# Patient Record
Sex: MALE | NOT HISPANIC OR LATINO
[De-identification: names, ages, dates, MRNs, and addresses within clinical notes are randomized per-mention and may not be internally consistent; named-entity substitution may affect disease eponyms.]

---

## 2017-10-18 ENCOUNTER — RECORDS - HEALTHEAST (OUTPATIENT)
Dept: ADMINISTRATIVE | Facility: OTHER | Age: 37
End: 2017-10-18

## 2017-12-06 ENCOUNTER — COMMUNICATION - HEALTHEAST (OUTPATIENT)
Dept: TELEHEALTH | Facility: CLINIC | Age: 37
End: 2017-12-06

## 2017-12-06 ENCOUNTER — OFFICE VISIT - HEALTHEAST (OUTPATIENT)
Dept: INTERNAL MEDICINE | Facility: CLINIC | Age: 37
End: 2017-12-06

## 2017-12-06 DIAGNOSIS — Z86.19 HISTORY OF LYME DISEASE: ICD-10-CM

## 2017-12-06 DIAGNOSIS — J30.89 CHRONIC NON-SEASONAL ALLERGIC RHINITIS, UNSPECIFIED TRIGGER: ICD-10-CM

## 2017-12-06 DIAGNOSIS — E03.9 HYPOTHYROIDISM, UNSPECIFIED TYPE: ICD-10-CM

## 2017-12-06 RX ORDER — ALBUTEROL SULFATE 90 UG/1
AEROSOL, METERED RESPIRATORY (INHALATION)
Refills: 0 | Status: SHIPPED | COMMUNITY
Start: 2017-10-05

## 2017-12-06 ASSESSMENT — MIFFLIN-ST. JEOR: SCORE: 1680.1

## 2017-12-07 ENCOUNTER — COMMUNICATION - HEALTHEAST (OUTPATIENT)
Dept: INTERNAL MEDICINE | Facility: CLINIC | Age: 37
End: 2017-12-07

## 2017-12-10 ENCOUNTER — COMMUNICATION - HEALTHEAST (OUTPATIENT)
Dept: INTERNAL MEDICINE | Facility: CLINIC | Age: 37
End: 2017-12-10

## 2017-12-10 DIAGNOSIS — Z86.19 HISTORY OF LYME DISEASE: ICD-10-CM

## 2017-12-11 RX ORDER — DOXYCYCLINE 100 MG/1
CAPSULE ORAL
Qty: 20 CAPSULE | Refills: 0 | Status: SHIPPED | OUTPATIENT
Start: 2017-12-11

## 2017-12-22 ENCOUNTER — COMMUNICATION - HEALTHEAST (OUTPATIENT)
Dept: INTERNAL MEDICINE | Facility: CLINIC | Age: 37
End: 2017-12-22

## 2018-02-23 ENCOUNTER — OFFICE VISIT - HEALTHEAST (OUTPATIENT)
Dept: INTERNAL MEDICINE | Facility: CLINIC | Age: 38
End: 2018-02-23

## 2018-02-23 ENCOUNTER — COMMUNICATION - HEALTHEAST (OUTPATIENT)
Dept: TELEHEALTH | Facility: CLINIC | Age: 38
End: 2018-02-23

## 2018-02-23 DIAGNOSIS — E06.3 HASHIMOTO'S THYROIDITIS: ICD-10-CM

## 2018-02-23 DIAGNOSIS — F41.9 ANXIETY: ICD-10-CM

## 2018-02-23 DIAGNOSIS — R12 HEARTBURN: ICD-10-CM

## 2018-02-23 RX ORDER — LEVOTHYROXINE SODIUM 75 UG/1
TABLET ORAL
Status: SHIPPED | COMMUNITY
Start: 2017-10-23

## 2018-02-23 ASSESSMENT — MIFFLIN-ST. JEOR: SCORE: 1680.1

## 2018-06-07 ENCOUNTER — COMMUNICATION - HEALTHEAST (OUTPATIENT)
Dept: INTERNAL MEDICINE | Facility: CLINIC | Age: 38
End: 2018-06-07

## 2018-06-07 RX ORDER — ZOLPIDEM TARTRATE 10 MG/1
10 TABLET ORAL
Qty: 30 TABLET | Refills: 0 | Status: SHIPPED | OUTPATIENT
Start: 2018-06-07

## 2018-10-08 ENCOUNTER — COMMUNICATION - HEALTHEAST (OUTPATIENT)
Dept: INTERNAL MEDICINE | Facility: CLINIC | Age: 38
End: 2018-10-08

## 2018-10-08 DIAGNOSIS — R12 HEARTBURN: ICD-10-CM

## 2018-12-21 ENCOUNTER — COMMUNICATION - HEALTHEAST (OUTPATIENT)
Dept: INTERNAL MEDICINE | Facility: CLINIC | Age: 38
End: 2018-12-21

## 2018-12-21 DIAGNOSIS — J30.89 CHRONIC NON-SEASONAL ALLERGIC RHINITIS: ICD-10-CM

## 2019-03-30 ENCOUNTER — COMMUNICATION - HEALTHEAST (OUTPATIENT)
Dept: INTERNAL MEDICINE | Facility: CLINIC | Age: 39
End: 2019-03-30

## 2019-03-30 DIAGNOSIS — R12 HEARTBURN: ICD-10-CM

## 2020-01-19 ENCOUNTER — COMMUNICATION - HEALTHEAST (OUTPATIENT)
Dept: INTERNAL MEDICINE | Facility: CLINIC | Age: 40
End: 2020-01-19

## 2020-01-19 DIAGNOSIS — R12 HEARTBURN: ICD-10-CM

## 2020-01-20 RX ORDER — PANTOPRAZOLE SODIUM 40 MG/1
TABLET, DELAYED RELEASE ORAL
Qty: 90 TABLET | Refills: 0 | Status: SHIPPED | OUTPATIENT
Start: 2020-01-20

## 2021-05-27 NOTE — TELEPHONE ENCOUNTER
RN cannot approve Refill Request    RN can NOT refill this medication PCP messaged that patient is overdue for Office Visit and Protocol failed and RN gave three month refill. Last office visit: 2/23/2018 Abelardo Elliott MD Last Physical: Visit date not found Last MTM visit: Visit date not found Last visit same specialty: 2/23/2018 Abelardo Elliott MD.  Next visit within 3 mo: Visit date not found  Next physical within 3 mo: Visit date not found  Last OV 2/23/18  Last refill 10/8/18 for 90/1    Susan Morales, [facility] Triage/Med Refill 4/1/2019    Requested Prescriptions   Pending Prescriptions Disp Refills     pantoprazole (PROTONIX) 40 MG tablet [Pharmacy Med Name: PANTOPRAZOLE 40MG TABLETS] 90 tablet 0     Sig: TAKE 1 TABLET(40 MG) BY MOUTH DAILY    GI Medications Refill Protocol Failed - 3/30/2019  3:57 AM       Failed - PCP or prescribing provider visit in last 12 or next 3 months.    Last office visit with prescriber/PCP: 2/23/2018 Abelardo Elliott MD OR same dept: Visit date not found OR same specialty: 2/23/2018 Abelardo Elliott MD  Last physical: Visit date not found Last MTM visit: Visit date not found   Next visit within 3 mo: Visit date not found  Next physical within 3 mo: Visit date not found  Prescriber OR PCP: Abelardo Elliott MD  Last diagnosis associated with med order: 1. Heartburn  - pantoprazole (PROTONIX) 40 MG tablet [Pharmacy Med Name: PANTOPRAZOLE 40MG TABLETS]; TAKE 1 TABLET(40 MG) BY MOUTH DAILY  Dispense: 90 tablet; Refill: 0    If protocol passes may refill for 12 months if within 3 months of last provider visit (or a total of 15 months).

## 2021-05-31 VITALS — WEIGHT: 166 LBS | BODY MASS INDEX: 23.24 KG/M2 | HEIGHT: 71 IN

## 2021-06-01 VITALS — BODY MASS INDEX: 23.24 KG/M2 | HEIGHT: 71 IN | WEIGHT: 166 LBS

## 2021-06-05 NOTE — TELEPHONE ENCOUNTER
RN cannot approve Refill Request    RN can NOT refill this medication Protocol failed and NO refill given. .      Traci Chavez, [facility] Triage/Med Refill 1/20/2020    Requested Prescriptions   Pending Prescriptions Disp Refills     pantoprazole (PROTONIX) 40 MG tablet [Pharmacy Med Name: PANTOPRAZOLE 40MG TABLETS] 90 tablet 3     Sig: TAKE 1 TABLET BY MOUTH EVERY DAY       GI Medications Refill Protocol Failed - 1/19/2020  9:04 AM        Failed - PCP or prescribing provider visit in last 12 or next 3 months.     Last office visit with prescriber/PCP: 2/23/2018 Abelardo Elliott MD OR same dept: Visit date not found OR same specialty: 2/23/2018 Abelardo Elliott MD  Last physical: Visit date not found Last MTM visit: Visit date not found   Next visit within 3 mo: Visit date not found  Next physical within 3 mo: Visit date not found  Prescriber OR PCP: Abelardo Elliott MD  Last diagnosis associated with med order: 1. Heartburn  - pantoprazole (PROTONIX) 40 MG tablet [Pharmacy Med Name: PANTOPRAZOLE 40MG TABLETS]; TAKE 1 TABLET BY MOUTH EVERY DAY  Dispense: 90 tablet; Refill: 0    If protocol passes may refill for 12 months if within 3 months of last provider visit (or a total of 15 months).

## 2021-06-14 NOTE — PROGRESS NOTES
[facility] Follow Up Note    Johnson Allison   37 y.o. male    Date of Visit: 12/6/2017    Chief Complaint   Patient presents with     [facility]     [place], second opinion for Tyroiditis/Hoshimoto     Subjective  Johnson is here as a new patient with a question on a diagnosis of Hashimoto's thyroiditis which is given to him last month.    He has been otherwise healthy individual except for mild intermittent asthma and allergic rhinitis that is been minimally symptomatic over years.  No major asthma exacerbation.  He just uses occasional albuterol, does not use a controller medication.  He does use albuterol twice a day when he has a cold, but otherwise does not use it.  No flare of his sinusitis at this time.    In late October he developed a significant cough, what he described as more than usual cold, possibly viral bronchitis.  He was treated with a Z-Vikram, did get better and is essentially resolved now no wheezing or significant cough currently.    He became significantly fatigued during this time in late October early November.  He was evaluated in November with thyroid tests and told he was hypothyroid.  He states he feels fully recovered at this time and is unsure if he needs to take thyroid hormone.    He does have occasional palpitations, but that predates this current illness he has not had any recently.  He describes them as 10-15 seconds of mild palpitations without associated symptoms, often happens at rest.  No history of syncope.  No history of cardiac disease.  Is not on another medication.    Non-smoker    He has a 3-year-old and a 5-year-old child at home, they have had the usual colds but no other major illnesses.    He has been otherwise healthy, some hand surgery 1999 and his tonsils out in 1980s some mild reflux he takes Protonix for.  That is controlled.    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    Family  "history of colon cancer with a grandfather but no other relatives.  No first-degree relative with colon cancer.  His parents are alive and well.  Sister at 41 doing well.  No heart disease in family.  His father has borderline hypertension.    Patient's blood pressure has been normal.  He has had cholesterol checked in the past he states it is very good.    He had Lyme disease last summer, [place] highly endemic [place] for [place] disease.  He was briefly hospitalized and had a full four-week treatment of doxycycline.  No arthritis syndrome or skin changes or evidence of chronic Lyme.    Patient is requesting prophylactic doxycycline in case of future deer tick bite.    He gets occasional migraine headaches, very rarely.  When he was ill earlier this fall he was having more frequent, but not now.    No mouth sores or swallowing problems.  No chest pain.  No increasing shortness of breath.  No edema.  No rash.    PMHx:  No past medical history on file.  PSHx:  No past surgical history on file.  Immunizations:   Immunization History   Administered Date(s) Administered     Influenza, seasonal,quad inj 36+ mos 12/06/2017       ROS A comprehensive review of systems was performed and was otherwise negative    Medications, allergies, and problem list were reviewed and updated    Exam  /82  Pulse 72  Ht 5' 11\" (1.803 m)  Wt 166 lb (75.3 kg)  SpO2 97%  BMI 23.15 kg/m2  Healthy-appearing thin male.  Alert and oriented ×3, normal mood and affect.  Pupils and irises equal and reactive.  Extraocular muscles intact and no exophthalmos.  No jaundice or conjunctivitis.  External ears and nose exam is normal and tympanic membranes are normal.  Pharynx is normal, except for some mild diffuse nonexudative cobblestoning pharyngitis.  Teeth in good condition.  No cervical or supraclavicular adenopathy, some scattered bilateral shoddy postinflammatory lymph nodes of less than 0.5 cm.  No thyromegaly or nodularity or thyroid " tenderness.  No JVD.  Lungs are clear to auscultation with normal respiratory excursion and no wheezing.  Heart is regular with no murmur rub or gallop.  Abdomen is nontender nonobese no hepatosplenomegaly.  No ankle edema.  Skin appears normal.  Gait normal.    Assessment/Plan  1. Hypothyroidism, unspecified type  Diagnosis of hypothyroid during a viral bronchitis type illness.  He may have had sick euthyroid, or possibly triggered a Hashimoto's thyroiditis.  He is clinically asymptomatic now.    TSH is borderline, I would not initiate thyroid treatment and follow over the next couple of months.    He does have profound hypothyroidism, could start the 75 mcg pill he already has.    He made an appoint with endocrinology in mid December, he can keep that appointment.  I also will have him follow-up with me next month.    Follow-up sooner if any worsening fatigue or new symptoms or worsening palpitations.  - Thyroid Stimulating Hormone (TSH)  - T4, Free    Previous palpitation description suggestive of benign premature beats.  Follow-up if worsening.    2. Chronic non-seasonal allergic rhinitis, unspecified trigger  Refill given  - fluticasone (FLONASE) 50 mcg/actuation nasal spray; 2 sprays into each nostril daily.  Dispense: 16 g; Refill: 12    Mild cold-induced wheezing in the past, mild intermittent asthma also some exercise-induced asthma.  Occasional use of albuterol.  I discussed controller medication, he does not need use inhalers on a regular basis, but he was told if he starts using the albuterol more than a couple days, he should consider not controlled with medication.    Flu shot was given today.    3. History of Lyme disease    - doxycycline (MONODOX) 100 MG capsule; Take two pills as soon as tick removed for prevention.  If you develop a rash or fever, get evaluated immediately.  Dispense: 20 capsule; Refill: 0    He appears to be low cardiovascular risk, he states his cholesterol is been excellent in  the past his blood pressures normal, no early heart disease and family a non-smoker.    Standard cancer screening at age 50.    Follow-up of the migraine headaches worsen.  He can use Aleve, stay well-hydrated.    Heartburn controlled with PPI    Return in about 6 weeks (around 1/17/2018) for Recheck.   Patient Instructions   Lab work today to check TSH and T4.  Results will be mailed to you.    He will plan to see the endocrinologist later this month.  You should bring all your records and labs from previous to that appointment.    Follow-up appointment with me in 6 weeks.    Flonase prescription sent to your pharmacy.    Prophylactic doxycycline prescription for deer tick bites was sent to your pharmacy.    He did not report a family history of early colon cancer in a first-degree relative, or multiple second-degree relatives.  Therefore colon cancer screening should start at age 50 for you if your bowels are normal.        Abelardo Elliott MD  Total time with patient over 35 minutes and over 50% coord care.  Time all face to face.      Current Outpatient Prescriptions   Medication Sig Dispense Refill     fluticasone (FLONASE) 50 mcg/actuation nasal spray 2 sprays into each nostril daily. 16 g 12     pantoprazole (PROTONIX) 40 MG tablet TK 1 T PO  ONCE D  3     PROAIR HFA 90 mcg/actuation inhaler INHALE 2 PUFF EVERY 4 HOURS AS NEEDED  0     zolpidem (AMBIEN) 10 mg tablet Take 10 mg by mouth at bedtime as needed for sleep.       doxycycline (MONODOX) 100 MG capsule Take two pills as soon as tick removed for prevention.  If you develop a rash or fever, get evaluated immediately. 20 capsule 0     No current facility-administered medications for this visit.      No Known Allergies  Social History   Substance Use Topics     Smoking status: Former Smoker     Smokeless tobacco: Former User     Types: Chew     Alcohol use None

## 2021-06-16 PROBLEM — E06.3 HASHIMOTO'S THYROIDITIS: Status: ACTIVE | Noted: 2018-02-23

## 2021-06-16 NOTE — PROGRESS NOTES
[facility] Follow Up Note    Johnson Allison   37 y.o. male    Date of Visit: 2/23/2018    Chief Complaint   Patient presents with     Anxiety     heart racing, anxious, jittery     Subjective  Johnson is coming in for a two-week history of increased anxiety with some insomnia.  He feels somewhat jittery inside and a feeling of palpitations but when he checks his heart rate is normal.    He cannot identify any precipitating factor, other than some moderate job stress, but he does not feel it is more than usual.    He does experience the anxiety in the late morning when he is at work.  He does not believe he feels it on the weekends.    Last weekend he did drink significant alcohol at a wedding.  Did not sleep well at weekend.    Otherwise she has been sleeping okay.    His 2 children, ages 3 and 5 are doing okay.    One child had some stomach flu a few weeks ago, the patient did not get sick.    No flare of his asthma or wheezing or increased cough or shortness of breath.    No headaches.  He does have a past history of rare migraines.    Anxiety symptoms do tend to get better in the evening.    He started his Synthroid 75 mcg a day 8 weeks ago.  He tolerated that well initially.    February 21 at the endocrinologist he had a TSH of 3.9 and a T4 of 1.0 and normal T3.    Normal basic metabolic profile last December.    He is not taking any cold medicine or diet medicine or other herbal supplements.    He drinks a moderate amount of caffeine in the morning.    On February 21 he stopped his Synthroid as he was convinced that was the cause of his symptoms.  His endocrinologist had told him to continue it.    PMHx:  No past medical history on file.  PSHx:  No past surgical history on file.  Immunizations:   Immunization History   Administered Date(s) Administered     Influenza, seasonal,quad inj 36+ mos 12/06/2017     Influenza,seasonal quad, PF, 6-35MOS 12/06/2017       ROS A comprehensive review  "of systems was performed and was otherwise negative    Medications, allergies, and problem list were reviewed and updated    Exam  /80  Pulse 80  Ht 5' 11\" (1.803 m)  Wt 166 lb (75.3 kg)  SpO2 98%  BMI 23.15 kg/m2  He appears mildly anxious, but otherwise normal affect.  Pupils and irises equal and reactive.  No neurologic changes.  Lungs are clear.  Heart is regular with no murmur rub or gallop.  Abdomen is nontender.  No ankle edema.  No tremor.  Skin appears normal.  Well-groomed.    Assessment/Plan  1. Hashimoto's thyroiditis  Possibility for some fluctuation in his thyroid inflammation, could cause a temporary spike in his thyroid hormone release.    I did tell patient it would be a good idea for him to continue on the thyroid medication, but he does want to really to evaluate symptoms further.  I will see him in 2 weeks to reevaluate and anticipate restarting thyroid medication at that time, could consider restarting lower dose of 50 mcg.    I will plan to recheck thyroid tests at two-week follow-up.    2. Anxiety  Possible work-related stress.  Patient will evaluate if anxiety spells are correlated to work, and do not have been on the weekends.  He was told to stop drinking alcohol and reduce caffeine.  Get regular exercise and good sleep.    Call if worsening symptoms.    3. Heartburn  Refill given  - pantoprazole (PROTONIX) 40 MG tablet; Take 1 tablet (40 mg total) by mouth daily.  Dispense: 90 tablet; Refill: 0    Mild intermittent asthma, no flare.  Only rare use of albuterol.    Return in about 2 weeks (around 3/9/2018) for Recheck.   Patient Instructions   Stay off the levothyroxine at this time.    Follow-up in 2 weeks for reevaluation.    Call if worsening symptoms before the two-week follow-up.    Get a good night sleep, reduce caffeine and do not drink alcohol.  Start getting regular exercise, 20 minutes aerobic exercise 3-4 times a week.    Abelardo Elliott MD      Current Outpatient " Prescriptions   Medication Sig Dispense Refill     levothyroxine (SYNTHROID, LEVOTHROID) 75 MCG tablet TK 1 T PO D       pantoprazole (PROTONIX) 40 MG tablet Take 1 tablet (40 mg total) by mouth daily. 90 tablet 0     doxycycline (MONODOX) 100 MG capsule Take two pills as soon as tick removed for prevention.  If you develop a rash or fever, get evaluated immediately. 20 capsule 0     fluticasone (FLONASE) 50 mcg/actuation nasal spray 2 sprays into each nostril daily. 16 g 12     PROAIR HFA 90 mcg/actuation inhaler INHALE 2 PUFF EVERY 4 HOURS AS NEEDED  0     zolpidem (AMBIEN) 10 mg tablet Take 10 mg by mouth at bedtime as needed for sleep.       No current facility-administered medications for this visit.      No Known Allergies  Social History   Substance Use Topics     Smoking status: Former Smoker     Smokeless tobacco: Former User     Types: Chew     Alcohol use None